# Patient Record
Sex: FEMALE | Race: BLACK OR AFRICAN AMERICAN | Employment: OTHER | URBAN - METROPOLITAN AREA
[De-identification: names, ages, dates, MRNs, and addresses within clinical notes are randomized per-mention and may not be internally consistent; named-entity substitution may affect disease eponyms.]

---

## 2023-01-11 ENCOUNTER — ESTABLISHED PATIENT (OUTPATIENT)
Dept: URBAN - METROPOLITAN AREA CLINIC 19 | Facility: CLINIC | Age: 83
End: 2023-01-11

## 2023-01-11 DIAGNOSIS — H26.493: ICD-10-CM

## 2023-01-11 DIAGNOSIS — H04.412: ICD-10-CM

## 2023-01-11 DIAGNOSIS — E11.9: ICD-10-CM

## 2023-01-11 PROCEDURE — 92014 COMPRE OPH EXAM EST PT 1/>: CPT

## 2023-01-11 ASSESSMENT — VISUAL ACUITY
OU_SC: 20/30
OS_PH: 20/25
OD_SC: 20/30-1
OS_SC: 20/40-2

## 2023-01-11 ASSESSMENT — KERATOMETRY
OD_K1POWER_DIOPTERS: 44.50
OD_K2POWER_DIOPTERS: 44.75
OS_AXISANGLE_DEGREES: 68
OS_K1POWER_DIOPTERS: 44.25
OD_AXISANGLE2_DEGREES: 9
OD_AXISANGLE_DEGREES: 99
OS_K2POWER_DIOPTERS: 44.75
OS_AXISANGLE2_DEGREES: 158

## 2023-01-11 ASSESSMENT — TONOMETRY
OS_IOP_MMHG: 13
OD_IOP_MMHG: 15

## 2024-01-10 ENCOUNTER — ESTABLISHED PATIENT (OUTPATIENT)
Dept: URBAN - METROPOLITAN AREA CLINIC 19 | Facility: CLINIC | Age: 84
End: 2024-01-10

## 2024-01-10 DIAGNOSIS — H26.493: ICD-10-CM

## 2024-01-10 DIAGNOSIS — E11.9: ICD-10-CM

## 2024-01-10 DIAGNOSIS — H04.412: ICD-10-CM

## 2024-01-10 PROCEDURE — 99214 OFFICE O/P EST MOD 30 MIN: CPT

## 2024-01-10 ASSESSMENT — KERATOMETRY
OD_K2POWER_DIOPTERS: 45
OD_AXISANGLE_DEGREES: 180
OD_AXISANGLE2_DEGREES: 90
OS_K1POWER_DIOPTERS: 45
OD_K1POWER_DIOPTERS: 45
OS_AXISANGLE_DEGREES: 150
OS_AXISANGLE2_DEGREES: 60
OS_K2POWER_DIOPTERS: 44.25

## 2024-01-10 ASSESSMENT — TONOMETRY
OD_IOP_MMHG: 11
OS_IOP_MMHG: 10

## 2024-01-10 ASSESSMENT — VISUAL ACUITY
OU_SC: 20/25
OS_SC: 20/40+1
OD_SC: 20/40+2

## 2025-01-14 ENCOUNTER — COMPREHENSIVE EXAM (OUTPATIENT)
Age: 85
End: 2025-01-14

## 2025-01-14 DIAGNOSIS — E11.9: ICD-10-CM

## 2025-01-14 DIAGNOSIS — H43.393: ICD-10-CM

## 2025-01-14 DIAGNOSIS — H26.493: ICD-10-CM

## 2025-01-14 PROCEDURE — 92014 COMPRE OPH EXAM EST PT 1/>: CPT
